# Patient Record
Sex: MALE | Race: WHITE
[De-identification: names, ages, dates, MRNs, and addresses within clinical notes are randomized per-mention and may not be internally consistent; named-entity substitution may affect disease eponyms.]

---

## 2021-12-13 ENCOUNTER — HOSPITAL ENCOUNTER (EMERGENCY)
Dept: HOSPITAL 75 - ER | Age: 31
Discharge: HOME | End: 2021-12-13
Payer: SELF-PAY

## 2021-12-13 VITALS — WEIGHT: 165.35 LBS | BODY MASS INDEX: 25.95 KG/M2 | HEIGHT: 66.93 IN

## 2021-12-13 VITALS — SYSTOLIC BLOOD PRESSURE: 116 MMHG | DIASTOLIC BLOOD PRESSURE: 70 MMHG

## 2021-12-13 DIAGNOSIS — E86.0: ICD-10-CM
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
## 2021-12-13 DIAGNOSIS — R55: ICD-10-CM

## 2021-12-13 DIAGNOSIS — S30.1XXA: Primary | ICD-10-CM

## 2021-12-13 LAB
ALBUMIN SERPL-MCNC: 4.4 GM/DL (ref 3.2–4.5)
ALP SERPL-CCNC: 40 U/L (ref 40–136)
ALT SERPL-CCNC: 43 U/L (ref 0–55)
BASOPHILS # BLD AUTO: 0 10^3/UL (ref 0–0.1)
BASOPHILS NFR BLD AUTO: 0 % (ref 0–10)
BILIRUB SERPL-MCNC: 1 MG/DL (ref 0.1–1)
BUN/CREAT SERPL: 14
CALCIUM SERPL-MCNC: 9 MG/DL (ref 8.5–10.1)
CHLORIDE SERPL-SCNC: 98 MMOL/L (ref 98–107)
CO2 SERPL-SCNC: 23 MMOL/L (ref 21–32)
CREAT SERPL-MCNC: 0.93 MG/DL (ref 0.6–1.3)
EOSINOPHIL # BLD AUTO: 0 10^3/UL (ref 0–0.3)
EOSINOPHIL NFR BLD AUTO: 0 % (ref 0–10)
GFR SERPLBLD BASED ON 1.73 SQ M-ARVRAT: 94 ML/MIN
GLUCOSE SERPL-MCNC: 109 MG/DL (ref 70–105)
HCT VFR BLD CALC: 46 % (ref 40–54)
HGB BLD-MCNC: 15.8 G/DL (ref 13.3–17.7)
LYMPHOCYTES # BLD AUTO: 0.9 10^3/UL (ref 1–4)
LYMPHOCYTES NFR BLD AUTO: 14 % (ref 12–44)
MANUAL DIFFERENTIAL PERFORMED BLD QL: NO
MCH RBC QN AUTO: 31 PG (ref 25–34)
MCHC RBC AUTO-ENTMCNC: 34 G/DL (ref 32–36)
MCV RBC AUTO: 92 FL (ref 80–99)
MONOCYTES # BLD AUTO: 0.4 10^3/UL (ref 0–1)
MONOCYTES NFR BLD AUTO: 6 % (ref 0–12)
NEUTROPHILS # BLD AUTO: 4.8 10^3/UL (ref 1.8–7.8)
NEUTROPHILS NFR BLD AUTO: 79 % (ref 42–75)
PLATELET # BLD: 164 10^3/UL (ref 130–400)
PMV BLD AUTO: 9.7 FL (ref 9–12.2)
POTASSIUM SERPL-SCNC: 3.9 MMOL/L (ref 3.6–5)
PROT SERPL-MCNC: 7.7 GM/DL (ref 6.4–8.2)
SODIUM SERPL-SCNC: 134 MMOL/L (ref 135–145)
WBC # BLD AUTO: 6.1 10^3/UL (ref 4.3–11)

## 2021-12-13 PROCEDURE — 36415 COLL VENOUS BLD VENIPUNCTURE: CPT

## 2021-12-13 PROCEDURE — 84484 ASSAY OF TROPONIN QUANT: CPT

## 2021-12-13 PROCEDURE — 80053 COMPREHEN METABOLIC PANEL: CPT

## 2021-12-13 PROCEDURE — 85025 COMPLETE CBC W/AUTO DIFF WBC: CPT

## 2021-12-13 PROCEDURE — 86141 C-REACTIVE PROTEIN HS: CPT

## 2021-12-13 PROCEDURE — 71045 X-RAY EXAM CHEST 1 VIEW: CPT

## 2021-12-13 PROCEDURE — 93005 ELECTROCARDIOGRAM TRACING: CPT

## 2021-12-13 NOTE — DIAGNOSTIC IMAGING REPORT
INDICATION: Seizures. Patient is Covid-19 positive.



TIME OF EXAM: 9:04 AM



No prior studies are available for comparison.



FINDINGS: The heart size is normal. The pulmonary vascularity is

unremarkable. The lungs are clear. No infiltrate, effusion or

pneumothorax is detected.



IMPRESSION: No acute cardiopulmonary process is detected.



Dictated by: 



  Dictated on workstation # [id number]

## 2021-12-13 NOTE — ED SYNCOPE
General


Stated Complaint:  COVID +;SEIZURES


Source of Information:  Patient


Exam Limitations:  No Limitations





History of Present Illness


Date Seen by Provider:  Dec 13, 2021


Time Seen by Provider:  07:56


Initial Comments


Patient ER by private conveyance with his father and chief complaint that this 

morning while in the bathroom he felt very hot/sweaty and and passed out.  He 

thought maybe he had a seizure.  He does not have a history of epilepsy.  He 

says this is happened before whenever he gets sick.  He has been symptomatic 

with a cough sometimes productive of sputum a little bit of fever upset stomach 

since Wednesday of the previous week, 12 days ago.  He did at home Covid test 

which was positive.  He does not have any significant medical history does not 

follow with a doctor and denies any pain.  He says he struck his face on the 

right side of his nose and forehead and has a little bit of discomfort sometimes

in his left upper abdomen.  No diarrhea or constipation.





Allergies and Home Medications


Allergies


Coded Allergies:  


     No Known Drug Allergies (Unverified , 21)





Patient Home Medication List


Home Medication List Reviewed:  Yes





Review of Systems


Constitutional:  No chills, No diaphoresis


EENTM:  No ear discharge, No ear pain


Respiratory:  cough, phlegm; No short of breath, No wheezing


Cardiovascular:  No chest pain, No edema


Gastrointestinal:  No abdominal pain, No constipation, No diarrhea; loss of 

appetite, nausea; No vomiting


Genitourinary:  No decreased output, No discharge


Musculoskeletal:  No back pain, No joint pain


Skin:  No pruritus, No rash





All Other Systems Reviewed


Negative Unless Noted:  Yes





Past Medical-Social-Family Hx


Patient Social History


Tobacco Use?:  No


Use of E-Cig and/or Vaping dev:  No


Substance use?:  No


Alcohol Use?:  No





Physical Exam


Vital Signs





Vital Signs - First Documented








 21





 07:55


 


Temp 36.8


 


Pulse 87


 


Resp 20


 


B/P (MAP) 120/82 (95)


 


Pulse Ox 95





Capillary Refill :


Height, Weight, BMI


Height: '"


Weight: lbs. oz. kg;  BMI


Method:


General Appearance:  No Apparent Distress, WD/WN


HEENT:  PERRL/EOMI; No Moist Mucous Membranes; TM Abnormal (L) (Mucoid effusion 

without erythema or injection nor loss of anatomic landmarks.)


Neck:  Full Range of Motion, Normal Inspection


Cardiovascular:  Regular Rate, Rhythm, No Edema, Normal Peripheral Pulses


Respiratory:  Lungs Clear, Normal Breath Sounds, No Accessory Muscle Use, No 

Respiratory Distress (98 to 99% on room air, nonlabored)


Gastrointestinal:  Normal Bowel Sounds, No Organomegaly, Non Tender, Soft


Neurologic/Psychiatric:  Alert, Oriented x3


Cranial Nerves:  Normal Hearing, Normal Speech, PERRL


Coordination/Gait:  Normal Gait


Motor/Sensory:  No Motor Deficit, No Sensory Deficit


Skin:  Normal Color, Warm/Dry





Progress/Results/Core Measures


Results/Orders


Lab Results





Laboratory Tests








Test


 21


08:30 Range/Units


 


 


White Blood Count


 6.1 


 4.3-11.0


10^3/uL


 


Red Blood Count


 5.05 


 4.30-5.52


10^6/uL


 


Hemoglobin 15.8  13.3-17.7  g/dL


 


Hematocrit 46  40-54  %


 


Mean Corpuscular Volume 92  80-99  fL


 


Mean Corpuscular Hemoglobin 31  25-34  pg


 


Mean Corpuscular Hemoglobin


Concent 34 


 32-36  g/dL





 


Red Cell Distribution Width 12.1  10.0-14.5  %


 


Platelet Count


 164 


 130-400


10^3/uL


 


Mean Platelet Volume 9.7  9.0-12.2  fL


 


Immature Granulocyte % (Auto) 0   %


 


Neutrophils (%) (Auto) 79 H 42-75  %


 


Lymphocytes (%) (Auto) 14  12-44  %


 


Monocytes (%) (Auto) 6  0-12  %


 


Eosinophils (%) (Auto) 0  0-10  %


 


Basophils (%) (Auto) 0  0-10  %


 


Neutrophils # (Auto)


 4.8 


 1.8-7.8


10^3/uL


 


Lymphocytes # (Auto)


 0.9 L


 1.0-4.0


10^3/uL


 


Monocytes # (Auto)


 0.4 


 0.0-1.0


10^3/uL


 


Eosinophils # (Auto)


 0.0 


 0.0-0.3


10^3/uL


 


Basophils # (Auto)


 0.0 


 0.0-0.1


10^3/uL


 


Immature Granulocyte # (Auto)


 0.0 


 0.0-0.1


10^3/uL


 


Sodium Level 134 L 135-145  MMOL/L


 


Potassium Level 3.9  3.6-5.0  MMOL/L


 


Chloride Level 98    MMOL/L


 


Carbon Dioxide Level 23  21-32  MMOL/L


 


Anion Gap 13  5-14  MMOL/L


 


Blood Urea Nitrogen 13  7-18  MG/DL


 


Creatinine


 0.93 


 0.60-1.30


MG/DL


 


Estimat Glomerular Filtration


Rate 94 


  





 


BUN/Creatinine Ratio 14   


 


Glucose Level 109 H   MG/DL


 


Calcium Level 9.0  8.5-10.1  MG/DL


 


Corrected Calcium 8.7  8.5-10.1  MG/DL


 


Total Bilirubin 1.0  0.1-1.0  MG/DL


 


Aspartate Amino Transf


(AST/SGOT) 35 H


 5-34  U/L





 


Alanine Aminotransferase


(ALT/SGPT) 43 


 0-55  U/L





 


Alkaline Phosphatase 40    U/L


 


Troponin I < 0.028  <0.028  NG/ML


 


C-Reactive Protein High


Sensitivity 0.97 H


 0.00-0.50


MG/DL


 


Total Protein 7.7  6.4-8.2  GM/DL


 


Albumin 4.4  3.2-4.5  GM/DL








My Orders





Orders - DON,HANG J


Orthostatic Vital Signs (Adult (21 08:07)


Cbc With Automated Diff (21 08:07)


Comprehensive Metabolic Panel (21 08:07)


Hs C Reactive Protein (21 08:07)


Chest 1 View, Ap/Pa Only (21 08:07)


Continuous Ekg Monitoring (21 08:07)


Ekg Tracing (21 08:07)


Troponin I Keshav (21 08:07)


Ed Iv/Invasive Line Start (21 08:07)


Lactated Ringers (Lr 1000 Ml Iv Solution (21 08:15)


Covid-19 External Lab Results (21 08:17)


Isolation Central Supply Req (21 08:17)


Ondansetron Injection (Zofran Injectio (21 08:45)


Ondansetron Injection (Zofran Injectio (21 08:41)


Ondansetron Injection (Zofran Injectio (21 10:15)


Prochlorperazine Tablet (Compazine Table (21 10:17)


Lidocaine 2% Viscous 15 Ml (Xylocaine Vi (21 10:30)


Famotidine Tablet (Pepcid Tablet) (21 10:17)


Antacid  Suspension (Mylanta  Suspension (21 10:30)


Diphenhydramine Tablet (Benadryl Tablet) (21 10:30)


Ketorolac Injection (Toradol Injection) (21 11:30)





Medications Given in ED





Current Medications








 Medications  Dose


 Ordered  Sig/No


 Route  Start Time


 Stop Time Status Last Admin


Dose Admin


 


 Al Hydrox/Mg


 Hydrox/Simethicone  30 ml  ONCE  ONCE


 PO  21 10:30


 21 10:31 DC 21 10:36


30 ML


 


 Diphenhydramine


 HCl  25 mg  ONCE  ONCE


 PO  21 10:30


 21 10:31 DC 21 10:42


25 MG


 


 Lactated Ringer's  1,000 ml @ 


 0 mls/hr  Q0M ONCE


 IV  21 08:15


 21 08:16 DC 21 08:19


1,000 MLS/HR


 


 Lidocaine HCl  15 ml  ONCE  ONCE


 PO  21 10:30


 21 10:31 DC 21 10:36


15 ML


 


 Ondansetron HCl  4 mg  ONCE  ONCE


 IVP  21 08:45


 21 08:46 DC 21 08:45


4 MG


 


 Ondansetron HCl  8 mg  ONCE  ONCE


 IVP  21 10:15


 21 10:16 DC 21 10:14


8 MG








Vital Signs/I&O











 21





 07:55


 


Temp 36.8


 


Pulse 87


 


Resp 20


 


B/P (MAP) 120/82 (95)


 


Pulse Ox 95











Progress


Progress Note #1:  


   Time:  08:13


Progress Note


The patient describes a story sounds more like vasovagal syncope especially with

a history.  Orthostatics were not positive however he may have drank some water 

since then.  His mouth does appear to be dry so we will check some labs, EKG and

give him a liter of LR and recheck him after that.  Vital signs are aseptic.  He

did inquire about monoclonal antibodies however he is well outside the window 

for benefit.


Progress Note #2:  


   Time:  10:16


Progress Note


Still having belching and epigastric burning. Zofran and GI cocktail.


Progress Note #3:  


   Time:  11:17


Progress Note


Patient states he is no longer having the dizziness, lightheadedness or feeling 

like he is in a pass out.  He is able to walk around the room without issue.  He

still having the pain in his epigastric region.  GI cocktail did not help much. 

Suspect that even though he is not exhibiting external markings he may have 

contusion of the abdominal wall so Toradol was offered.  He is ready to go home 

drink fluids and follow-up with a PCP.  He does not have a primary care provider

yet so we will provide him with a list


Initial ECG Impression Date:  Dec 13, 2021


Initial ECG Impression Time:  07:58


Initial ECG Rate:  81


Initial ECG Rhythm:  Normal Sinus


Initial ECG Intervals:  Normal


Initial ECG Impression:  Normal, Nonspecific Changes


Initial ECG Comparisson:  No Previous ECG Available


Comment


Normal sinus rhythm with borderline left axis deviation.  No evidence of ST 

deviation.





Diagnostic Imaging





   Diagonstic Imaging:  Xray


   Plain Films/CT/US/NM/MRI:  chest


Comments


                 [facility] [place], [place]





NAME:   JOHN PEREZ


MED REC#:   [id number]


ACCOUNT#:   [id number]


PT STATUS:   REG ER


:   1990


PHYSICIAN:   HANG COHOA MD


ADMIT DATE:   21/ER


                                   ***Draft***


Date of Exam:21





CHEST 1 VIEW, AP/PA ONLY








INDICATION: Seizures. Patient is Covid-19 positive.





TIME OF EXAM: 9:04 AM





No prior studies are available for comparison.





FINDINGS: The heart size is normal. The pulmonary vascularity is


unremarkable. The lungs are clear. No infiltrate, effusion or


pneumothorax is detected.





IMPRESSION: No acute cardiopulmonary process is detected.





  Dictated on workstation # [id number]








Dict:   21 09


Trans:   21


 [id number]





Interpreted by:     JALEEL ELIZONDO MD


Electronically signed by:


   Reviewed:  Reviewed by Me





Departure


Impression





   Primary Impression:  


   Syncope and collapse


   Additional Impressions:  


   Dehydration


   Abdominal wall contusion


   Qualified Codes:  S30.1XXA - Contusion of abdominal wall, initial encounter


Disposition:  01 HOME, SELF-CARE


Condition:  Stable





Departure-Patient Inst.


Decision time for Depature:  11:18


Patient Instructions:  Contusion (DC), LOCAL PHYSICIAN LIST, Syncope (Fainting) 

(DC)





Add. Discharge Instructions:  


Drink lots of fluids especially over the next couple days to prevent passing out

again.


I would suggest following up in 1 to 2 weeks with a primary care provider and we

have added a list of local doctors you can follow-up with.


Return to the ER for continued symptoms.


Tylenol and ibuprofen as necessary for pain in your stomach.


Work/School Note:  Work Release Form   Date Seen in the Emergency Department:  

Dec 13, 2021


   Return to Work:  Dec 14, 2021


   Restrictions:  No Restrictions











HANG OCHOA                 Dec 13, 2021 08:15